# Patient Record
Sex: MALE | Race: BLACK OR AFRICAN AMERICAN | NOT HISPANIC OR LATINO | Employment: UNEMPLOYED | URBAN - METROPOLITAN AREA
[De-identification: names, ages, dates, MRNs, and addresses within clinical notes are randomized per-mention and may not be internally consistent; named-entity substitution may affect disease eponyms.]

---

## 2022-01-21 ENCOUNTER — HOSPITAL ENCOUNTER (EMERGENCY)
Facility: HOSPITAL | Age: 42
Discharge: HOME | End: 2022-01-21
Attending: EMERGENCY MEDICINE

## 2022-01-21 VITALS
OXYGEN SATURATION: 100 % | RESPIRATION RATE: 18 BRPM | TEMPERATURE: 98 F | DIASTOLIC BLOOD PRESSURE: 82 MMHG | SYSTOLIC BLOOD PRESSURE: 135 MMHG | BODY MASS INDEX: 29.33 KG/M2 | HEART RATE: 58 BPM | WEIGHT: 198 LBS | HEIGHT: 69 IN

## 2022-01-21 DIAGNOSIS — M25.511 ACUTE PAIN OF RIGHT SHOULDER: Primary | ICD-10-CM

## 2022-01-21 DIAGNOSIS — M25.519 SHOULDER PAIN: ICD-10-CM

## 2022-01-21 PROCEDURE — 96372 THER/PROPH/DIAG INJ SC/IM: CPT

## 2022-01-21 PROCEDURE — 99284 EMERGENCY DEPT VISIT MOD MDM: CPT | Mod: 25

## 2022-01-21 PROCEDURE — 63600175 PHARM REV CODE 636 W HCPCS: Performed by: NURSE PRACTITIONER

## 2022-01-21 RX ORDER — ORPHENADRINE CITRATE 30 MG/ML
60 INJECTION INTRAMUSCULAR; INTRAVENOUS
Status: COMPLETED | OUTPATIENT
Start: 2022-01-21 | End: 2022-01-21

## 2022-01-21 RX ORDER — TIZANIDINE 4 MG/1
4 TABLET ORAL EVERY 8 HOURS PRN
Qty: 20 TABLET | Refills: 0 | OUTPATIENT
Start: 2022-01-21 | End: 2022-09-29

## 2022-01-21 RX ORDER — DEXAMETHASONE SODIUM PHOSPHATE 4 MG/ML
12 INJECTION, SOLUTION INTRA-ARTICULAR; INTRALESIONAL; INTRAMUSCULAR; INTRAVENOUS; SOFT TISSUE
Status: COMPLETED | OUTPATIENT
Start: 2022-01-21 | End: 2022-01-21

## 2022-01-21 RX ORDER — NAPROXEN 500 MG/1
500 TABLET ORAL EVERY 12 HOURS PRN
Qty: 20 TABLET | Refills: 0 | OUTPATIENT
Start: 2022-01-21 | End: 2022-09-29

## 2022-01-21 RX ADMIN — ORPHENADRINE CITRATE 60 MG: 30 INJECTION INTRAMUSCULAR; INTRAVENOUS at 01:01

## 2022-01-21 RX ADMIN — DEXAMETHASONE SODIUM PHOSPHATE 12 MG: 4 INJECTION INTRA-ARTICULAR; INTRALESIONAL; INTRAMUSCULAR; INTRAVENOUS; SOFT TISSUE at 01:01

## 2022-01-21 NOTE — ED PROVIDER NOTES
Encounter Date: 1/21/2022    SCRIBE #1 NOTE: I, Jammie Lewis, am scribing for, and in the presence of,  Pedro Henry NP. I have scribed the following portions of the note - Other sections scribed: HPI, ROS.       History     Chief Complaint   Patient presents with    Shoulder Pain     Pt to ER with c/o right shoulder pain x 2 days. 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 Patient reports associated symptoms of occasional tingling and numbness in his right hand. Patient reports pain worsens with movement. Patient denies any fever, chills, shortness of breath, chest pain, neck pain, back pain, abdominal pain, ear pain, eye pain, or any other associated symptoms. No known drug allergies.      The history is provided by the patient. No  was used.     Review of patient's allergies indicates:  No Known Allergies  History reviewed. No pertinent past medical history.  History reviewed. No pertinent surgical history.  History reviewed. No pertinent family history.  Social History     Tobacco Use    Smoking status: Never Smoker    Smokeless tobacco: Never Used   Substance Use Topics    Alcohol use: Never    Drug use: Never     Review of Systems   Constitutional: Negative for chills and fever.   HENT: Negative for ear pain.    Eyes: Negative for pain.   Respiratory: Negative for shortness of breath.    Cardiovascular: Negative for chest pain.   Gastrointestinal: Negative for abdominal pain.   Musculoskeletal: Positive for arthralgias (right shoulder). Negative for back pain and neck pain.       Physical Exam     Initial Vitals [01/21/22 1216]   BP Pulse Resp Temp SpO2   135/82 (!) 58 18 97.9 °F (36.6 °C) 100 %      MAP       --          Physical Exam    Nursing note and vitals reviewed.  Constitutional: He appears well-developed and well-nourished. He is not diaphoretic. No distress.   HENT:   Head: Normocephalic and atraumatic.   Right Ear: External ear normal.   Left Ear: External ear normal.   Nose: Nose normal.   Eyes: EOM are normal. Right eye exhibits no discharge. Left eye exhibits no discharge.   Neck: Neck supple. No tracheal deviation present.   Normal range of motion.  Cardiovascular: Normal rate.   Pulmonary/Chest: No stridor. No respiratory distress.   Abdominal: Abdomen is soft. He exhibits no distension. There is no abdominal tenderness.   Musculoskeletal:         General: No tenderness. Normal range of motion.      Cervical back: Normal range of motion and neck supple.      Comments: Mild tenderness overlying the deltoid.  Active range of motion fully intact, however pain is exacerbated when patient lifts his arm above his head.  Radial pulse normal.  Compartments are soft.  No pain or tenderness to the neck     Neurological: He is alert and oriented to person, place, and time. He has normal strength. No cranial nerve deficit.   Skin: Skin is warm and dry.   Psychiatric: He has a normal mood and affect. His behavior is normal. Judgment and thought content normal.         ED Course   Procedures  Labs Reviewed - No data to display       Imaging Results          X-Ray Shoulder Trauma Right (Final result)  Result time 01/21/22 13:03:43    Final result by Dixon Mallory MD (01/21/22 13:03:43)                 Impression:      No acute bony abnormality.      Electronically signed by: Dixon Mallory MD  Date:    01/21/2022  Time:    13:03             Narrative:    EXAMINATION:  XR SHOULDER TRAUMA 3 VIEW RIGHT    CLINICAL HISTORY:  Pain in unspecified shoulder    TECHNIQUE:  Three or four views of the right shoulder were performed.    COMPARISON:  None.    FINDINGS:  No evidence of acute fracture or dislocation.  Soft tissues are  symmetric.  No unexpected radiopaque foreign body.                                 Medications   orphenadrine injection 60 mg (60 mg Intramuscular Given 1/21/22 1332)   dexamethasone injection 12 mg (12 mg Intramuscular Given 1/21/22 1332)     Medical Decision Making:   History:   Old Medical Records: I decided to obtain old medical records.  Differential Diagnosis:   Fracture, dislocation, sprain, strain, spasm, others  Clinical Tests:   Radiological Study: Ordered and Reviewed  ED Management:  HPI and physical exam as above.    Patient with right shoulder pain after lifting heavy objects at work.  Physical exam with mild tenderness overlying the deltoid.  Otherwise unremarkable.  No evidence of infectious process.  X-rays unremarkable.  No evidence of emergent pathology at this time.  Will treat symptomatically. Advised patient to follow up with his PCP for re-evaluation and further management.  ED return precautions given. All questions regarding diagnosis and plan were answered to the patient's fullest possible satisfaction. Patient expressed understanding of diagnosis, discharge instructions, and return precautions.            Patient note was created using [employer] voice dictation software.  Any errors in syntax or information may not have been identified and edited prior to signing this note.            Scribe Attestation:   Scribe #1: I performed the above scribed service and the documentation accurately describes the services I performed. I attest to the accuracy of the note.                 Clinical Impression:   Final diagnoses:  [M25.519] Shoulder pain  [M25.511] Acute pain of right shoulder (Primary)        I, Pedro Henry NP, personally performed the services described in this documentation. All medical record entries made by the scribe were at my direction and in my presence. I have reviewed the chart and agree that the record reflects my personal performance and is accurate and complete.       ED  Disposition Condition    Discharge Stable        ED Prescriptions     Medication Sig Dispense Start Date End Date Auth. Provider    naproxen (NAPROSYN) 500 MG tablet Take 1 tablet (500 mg total) by mouth every 12 (twelve) hours as needed (Pain). 20 tablet 1/21/2022  Pedro Henry NP    tiZANidine (ZANAFLEX) 4 MG tablet Take 1 tablet (4 mg total) by mouth every 8 (eight) hours as needed (Muscle Spasms). 20 tablet 1/21/2022  Pedro Henry NP        Follow-up Information     Follow up With Specialties Details Why Contact Info    [facility]  Schedule an appointment as soon as possible for a visit in 1 week For further evaluation [address] [place] [address]  [phone]      [facility] Emergency Dept Emergency Medicine Go to  If symptoms worsen, As needed [address] [facility] [address] [phone]           Pedro Henry NP  01/21/22 2040

## 2022-01-21 NOTE — DISCHARGE INSTRUCTIONS

## 2022-01-21 NOTE — ED TRIAGE NOTES
Pt. Report for the past 2-3 days he has been having right shoulder pain. Pt. Reports he works in construction. Pt. Reports at times he has been having numbness to his right hand/fingers.

## 2022-01-21 NOTE — Clinical Note
"Caryn Tomasgavino Kilgore was seen and treated in our emergency department on 1/21/2022.  He may return to work on 01/26/2022.       If you have any questions or concerns, please don't hesitate to call.      Pedro Henry NP"

## 2022-01-21 NOTE — FIRST PROVIDER EVALUATION
Emergency Department TeleTriage Encounter Note      CHIEF COMPLAINT    Chief Complaint   Patient presents with    Shoulder Pain     Pt to ER with c/o right shoulder pain x 2 days. 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 male presenting to the ED complaining of nontraumatic right shoulder pain for one week.     Initial orders will be placed and care will be transferred to an alternate provider when patient is roomed for a full evaluation. Any additional orders and the final disposition will be determined by that provider.           ORDERS  Labs Reviewed - No data to display    ED Orders (720h ago, onward)    Start Ordered     Status Ordering Provider    01/21/22 1223 01/21/22 1222  X-Ray Shoulder Trauma Right  1 time imaging         Ordered JULIA MONTERO            Virtual Visit Note: The provider triage portion of this emergency department evaluation and documentation was performed via [employer], a HIPAA-compliant telemedicine application, in concert with a tele-presenter in the room. A face to face patient evaluation with one of my colleagues will occur once the patient is placed in an emergency department room.      DISCLAIMER: This note was prepared with [employer] voice recognition transcription software. Garbled syntax, mangled pronouns, and other bizarre constructions may be attributed to that software system.

## 2022-09-29 ENCOUNTER — HOSPITAL ENCOUNTER (EMERGENCY)
Facility: HOSPITAL | Age: 42
Discharge: HOME | End: 2022-09-29
Attending: EMERGENCY MEDICINE

## 2022-09-29 VITALS
HEIGHT: 69 IN | OXYGEN SATURATION: 99 % | DIASTOLIC BLOOD PRESSURE: 85 MMHG | BODY MASS INDEX: 28.58 KG/M2 | RESPIRATION RATE: 17 BRPM | WEIGHT: 193 LBS | SYSTOLIC BLOOD PRESSURE: 122 MMHG | TEMPERATURE: 98 F | HEART RATE: 71 BPM

## 2022-09-29 DIAGNOSIS — H92.02 OTALGIA OF LEFT EAR: ICD-10-CM

## 2022-09-29 DIAGNOSIS — H61.23 BILATERAL IMPACTED CERUMEN: Primary | ICD-10-CM

## 2022-09-29 PROCEDURE — 99284 EMERGENCY DEPT VISIT MOD MDM: CPT | Mod: 25,ER

## 2022-09-29 PROCEDURE — 69209 REMOVE IMPACTED EAR WAX UNI: CPT | Mod: 50,ER

## 2022-09-29 PROCEDURE — 25000003 PHARM REV CODE 250: Mod: ER | Performed by: NURSE PRACTITIONER

## 2022-09-29 RX ORDER — IBUPROFEN 600 MG/1
600 TABLET ORAL
Status: COMPLETED | OUTPATIENT
Start: 2022-09-29 | End: 2022-09-29

## 2022-09-29 RX ORDER — AMOXICILLIN 500 MG/1
500 CAPSULE ORAL 2 TIMES DAILY
Qty: 20 CAPSULE | Refills: 0 | Status: SHIPPED | OUTPATIENT
Start: 2022-09-29 | End: 2022-10-09

## 2022-09-29 RX ORDER — DEXTROMETHORPHAN HYDROBROMIDE, GUAIFENESIN 5; 100 MG/5ML; MG/5ML
650 LIQUID ORAL EVERY 8 HOURS
Qty: 20 TABLET | Refills: 0 | Status: SHIPPED | OUTPATIENT
Start: 2022-09-29 | End: 2022-10-04

## 2022-09-29 RX ORDER — IBUPROFEN 600 MG/1
600 TABLET ORAL EVERY 6 HOURS PRN
Qty: 20 TABLET | Refills: 0 | Status: SHIPPED | OUTPATIENT
Start: 2022-09-29 | End: 2022-10-04

## 2022-09-29 RX ADMIN — IBUPROFEN 600 MG: 600 TABLET ORAL at 01:09

## 2022-09-29 NOTE — Clinical Note
"Caryn Pulido" Jame was seen and treated in our emergency department on 9/29/2022.  He may return to work on 10/03/2022.       If you have any questions or concerns, please don't hesitate to call.      DEBBY Velez"

## 2022-09-29 NOTE — ED NOTES
Pt consented to being seen/treated in mejia chair. Pt prefers treatment in hallway instead of waiting for room. Provider notified.

## 2022-09-29 NOTE — ED PROVIDER NOTES
Encounter Date: 9/29/2022    SCRIBE #1 NOTE: I, Tanvir Lauren, am scribing for, and in the presence of,  Ele Baez NP. I have scribed the following portions of the note - Other sections scribed: HPI,ROS,PE.     History     Chief Complaint   Patient presents with    Otalgia     Left ear pain, increasing x 2 days, denies drainage, reports some hearing loss     Patient is a 41 year old male who presents to ED with complaints of left ear otalgia onset 2 days ago that worsened last night. He endorses smoking but denies alcohol or recreational drug use. Patient denies rash, fever, chest pain, SOB, numbness, weakness, tingling, abdominal pain, back pain, dysuria, hematuria, nausea, vomiting, diarrhea, or any other complaints.  Patient rates his pain as 8/10 and has not taken any medications for the symptoms.  No Alleviating/aggravating factors.      The history is provided by the patient. No  was used.   Review of patient's allergies indicates:  No Known Allergies  History reviewed. No pertinent past medical history.  History reviewed. No pertinent surgical history.  History reviewed. No pertinent family history.  Social History     Tobacco Use    Smoking status: Never    Smokeless tobacco: Never   Substance Use Topics    Alcohol use: Never    Drug use: Never     Review of Systems   Constitutional:  Negative for chills and fever.   HENT:  Positive for ear pain. Negative for congestion, rhinorrhea and sore throat.    Eyes:  Negative for pain, discharge and redness.   Respiratory:  Negative for cough and shortness of breath.    Cardiovascular:  Negative for chest pain.   Gastrointestinal:  Negative for abdominal pain, diarrhea, nausea and vomiting.   Genitourinary:  Negative for dysuria.   Musculoskeletal:  Negative for back pain, neck pain and neck stiffness.   Skin:  Negative for rash.   Neurological:  Negative for dizziness, weakness, light-headedness, numbness and headaches.   Psychiatric/Behavioral:   Negative for confusion.      Physical Exam     Initial Vitals [09/29/22 1046]   BP Pulse Resp Temp SpO2   (!) 159/83 63 19 98.2 °F (36.8 °C) 100 %      MAP       --         Physical Exam    Nursing note and vitals reviewed.  Constitutional: Vital signs are normal. He appears well-developed and well-nourished. He is cooperative.  Non-toxic appearance. He does not appear ill.   HENT:   Head: Normocephalic and atraumatic.   Right Ear: External ear and ear canal normal. No mastoid tenderness.   Left Ear: External ear and ear canal normal. No mastoid tenderness.   Nose: Nose normal.   Mouth/Throat: Uvula is midline, oropharynx is clear and moist and mucous membranes are normal.   Bilateral canals normal; bilateral cerumen impaction, unable to visualize TMs; no mastoid tenderness bilaterally; irrigation of both ears performed, some cerumen removed but still cerumen impaction with inability to visualize TMs   Eyes: Conjunctivae and EOM are normal. Pupils are equal, round, and reactive to light.   Neck: Neck supple. No Brudzinski's sign and no Kernig's sign noted.   Normal range of motion.  Cardiovascular:  Normal rate and regular rhythm.           Pulmonary/Chest: Effort normal and breath sounds normal.   Abdominal: Abdomen is soft. There is no abdominal tenderness. There is no rebound and no guarding.   Musculoskeletal:         General: Normal range of motion.      Cervical back: Normal range of motion and neck supple.     Lymphadenopathy:     He has no cervical adenopathy.   Neurological: He is alert and oriented to person, place, and time. No cranial nerve deficit. GCS eye subscore is 4. GCS verbal subscore is 5. GCS motor subscore is 6.   Skin: Skin is warm, dry and intact. No rash noted.   Psychiatric: He has a normal mood and affect. His speech is normal and behavior is normal. Thought content normal.       ED Course   Ear Wax Removal    Date/Time: 9/29/2022 2:11 PM  Performed by: DEBBY Velez  Authorized by:  Chad Barnett MD     Anesthesia:  Local Anesthetic: none  Location details: both ears  Procedure type: irrigation Cerumen Removal Results: Cerumen partially removed.  Patient tolerance: Patient tolerated the procedure well with no immediate complications      Labs Reviewed - No data to display       Imaging Results    None          Medications   ibuprofen tablet 600 mg (600 mg Oral Given 9/29/22 1334)           APC / Resident Notes:   This is an evaluation of a 41 y.o. male that presents to the Emergency Department for left ear pain    Physical Exam shows a non-toxic, afebrile, and well appearing male. Bilateral canals normal; bilateral cerumen impaction, unable to visualize TMs; no mastoid tenderness bilaterally; irrigation of both ears performed, some cerumen removed but still cerumen impaction with inability to visualize TMs    Vital signs are reassuring. If available, previous records reviewed. Unable to completely remove cerumen, patient complaining of pain; will for OM since TMs are not visulaized and refer to ENT    My overall impression is Bilateral Cerumen impaction. I considered, but at this time, do not suspect OE, mastoiditis, pharyngitis, cellulitis, abscess.    ED Course: Ear irrigation. Discharge Meds/Instructions: Amoxicillin, Tylenol, Ibuprofen, ENT referral. The diagnosis, treatment plan, instructions for follow-up as well as ED return precautions were discussed. All questions have been answered.      Scribe Attestation:   Scribe #1: I performed the above scribed service and the documentation accurately describes the services I performed. I attest to the accuracy of the note.           I, ADELINE Mejia, personally performed the services described in this documentation. All medical record entries made by the scribe were at my direction and in my presence. I have reviewed the chart and agree that the record reflects my personal performance and is accurate and complete.          Clinical  Impression:   Final diagnoses:  [H61.23] Bilateral impacted cerumen (Primary)  [H92.02] Otalgia of left ear      ED Disposition Condition    Discharge Stable          ED Prescriptions       Medication Sig Dispense Start Date End Date Auth. Provider    amoxicillin (AMOXIL) 500 MG capsule Take 1 capsule (500 mg total) by mouth 2 (two) times daily. for 10 days 20 capsule 9/29/2022 10/9/2022 DEBBY Velez    acetaminophen (TYLENOL) 650 MG TbSR Take 1 tablet (650 mg total) by mouth every 8 (eight) hours. for 5 days 20 tablet 9/29/2022 10/4/2022 DEBBY Velez    ibuprofen (ADVIL,MOTRIN) 600 MG tablet Take 1 tablet (600 mg total) by mouth every 6 (six) hours as needed for Pain. 20 tablet 9/29/2022 10/4/2022 DEBBY Velez          Follow-up Information       Follow up With Specialties Details Why Contact Info Additional Information    [facility] - [facility] Otolaryngology Schedule an appointment as soon as possible for a visit in 2 days  [address]  [facility] [address] [phone] Ear, Nose & Throat Services - Main Building, 4th Floor Please park in [facility] and use Clinic elevator    [facility] ED Emergency Medicine Go to  If symptoms worsen [address] [facility] [address] [phone]              DEBBY Velez  09/29/22 9496